# Patient Record
Sex: MALE
[De-identification: names, ages, dates, MRNs, and addresses within clinical notes are randomized per-mention and may not be internally consistent; named-entity substitution may affect disease eponyms.]

---

## 2017-06-12 ENCOUNTER — HOSPITAL ENCOUNTER (EMERGENCY)
Dept: HOSPITAL 14 - H.ER | Age: 42
Discharge: HOME | End: 2017-06-12
Payer: COMMERCIAL

## 2017-06-12 VITALS
DIASTOLIC BLOOD PRESSURE: 86 MMHG | SYSTOLIC BLOOD PRESSURE: 144 MMHG | HEART RATE: 97 BPM | RESPIRATION RATE: 16 BRPM | TEMPERATURE: 98.4 F | OXYGEN SATURATION: 99 %

## 2017-06-12 DIAGNOSIS — K11.20: Primary | ICD-10-CM

## 2017-06-12 NOTE — ED PDOC
HPI: Allergic Reaction


Time Seen by Provider: 06/12/17 19:43


Chief Complaint (Nursing): Allergic Reaction


Chief Complaint (Provider): Facial Swelling 


History Per: Patient


History/Exam Limitations: no limitations


Onset/Duration Of Symptoms: Hrs (prior to arrival )


Additional Complaint(s): 





Mynor Vargas, 41 year old male presents to the ED on 6/12/17 after 

experiencing facial swelling. The patient reports facial swelling 3 times in 

the past after eating cured meat. He reports the first couple of times of 

facial swelling after eating other meats, the swelling has decreased. He ate 

salami, noted that the swelling was resolved and then ate tacos with beef with 

recurring swelling. The swelling is isolated to the left facial area near the 

jawline. The patient reports tightness and mild tenderness, stating that the 

swelling feels like a straining pressure. He denies fever, chills, nausea, 

vomiting, body aches, neck pain, dizziness, chest pain, shortness of breath, or 

headaches. He also has no known food allergies. 





Past Medical History


Reviewed: Historical Data, Nursing Documentation, Vital Signs


Vital Signs: 


 Last Vital Signs











Temp  98.4 F   06/12/17 19:21


 


Pulse  97 H  06/12/17 19:21


 


Resp  16   06/12/17 19:21


 


BP  144/86   06/12/17 19:21


 


Pulse Ox  99   06/12/17 19:21














- Medical History


PMH: Gastrointestinal Ulcer (G. I. bleed)


   Denies: Chronic Kidney Disease





- Surgical History


Surgical History: Cholecystectomy, Endoscopy





- Family History


Family History: States: Unknown Family Hx





- Immunization History


Hx Tetanus Toxoid Vaccination: Yes


Hx Influenza Vaccination: No


Hx Pneumococcal Vaccination: No





- Home Medications


Home Medications: 


 Ambulatory Orders











 Medication  Instructions  Recorded


 


traMADol [Ultram] 50 mg PO Q4 #20 tab 10/27/16


 


Hydrocodone/Acetaminophen 1 each PO Q6 PRN #15 tablet 10/29/16





[Hydrocodon-Acetaminophen 5-325]  


 


Ondansetron [Zofran Odt] 4 mg PO Q8 PRN #12 odt 10/29/16


 


Amoxicillin/Clavulanate [Augmentin 1 tab PO BID #14 tab 06/13/17





875 MG-125 MG]  














- Allergies


Allergies/Adverse Reactions: 


 Allergies











Allergy/AdvReac Type Severity Reaction Status Date / Time


 


No Known Allergies Allergy   Unverified 10/29/16 07:20














Review of Systems


ROS Statement: Except As Marked, All Systems Reviewed And Found Negative


Constitutional: Negative for: Fever, Chills, Other (no body aches)


ENT: Positive for: Mouth Swelling (swelling localized to the left facial area 

near jawline )


Cardiovascular: Negative for: Chest Pain


Respiratory: Negative for: Shortness of Breath


Gastrointestinal: Negative for: Nausea, Vomiting


Musculoskeletal: Negative for: Neck Pain


Neurological: Negative for: Headache, Dizziness





Physical Exam





- Reviewed


Nursing Documentation Reviewed: Yes


Vital Signs Reviewed: Yes





- Physical Exam


Appears: Positive for: Non-toxic, No Acute Distress


Head Exam: Positive for: ATRAUMATIC, NORMOCEPHALIC


ENT: Positive for: Normal ENT Inspection, Pharynx Is (normal ), TM Is/Are (

normal ).  Negative for: Tonsillar Swelling (and no uvula or tonque swelling; 

parotid area swelling present ), Other (no gum erythema; no mastoid process 

tenderness )


Neurologic/Psych: Positive for: Alert, Oriented (x3)





- ECG


O2 Sat by Pulse Oximetry: 99 (RA)


Pulse Ox Interpretation: Normal





Disposition





- Clinical Impression


Clinical Impression: 


 Sialadenitis








- Patient ED Disposition


Is Patient to be Admitted: No


Counseled Patient/Family Regarding: Studies Performed, Diagnosis, Need For 

Followup, Rx Given





- Disposition


Disposition: Routine/Home


Disposition Time: 21:00


Condition: STABLE


Additional Instructions: 


suck on very sour foods ie lemon, lime and or sour candy. and put warm compress 

to area


Prescriptions: 


Amoxicillin/Clavulanate [Augmentin 875 MG-125 MG] 1 tab PO BID #14 tab


Instructions:  Sialoadenitis (ED)





Medical Decision Making


Medical Decision Making: 





Initial Impression:


Patient has sialadenitis. There is low suspicion for mumps/parotitis due to no 

nausea, vomiting, fever, body aches and unilateral swelling. Low suspicion for 

lymph node infection due to no nausea, vomiting, fever, body aches, and 

unilateral swelling. While in ED, patient manually moved jaw which led to 

decreased swelling. PT also givne Rx for augmentin. advised if with pus 

drainage to consume. 


Advised patient to suck on sour things to elicit salivation and open blocked 

duct. Swelling does not affect underneath eyes and there is no protrusion to 

the ears. Patient advised to return to the emergency room if he experiences any 

kind of body aches, fever, or chills. Advised patient to apply warm compress to 

area of swelling. Patient agreed to treatment plan. 





--------------------------------------------------------------------------------

----------------- 


Scribe Attestation:


Documented by Mariana Castillo, acting as a scribe for Margarita Pope PA-C.


 


Provider Scribe Attestation:


All medical record entries made by the Scribe were at my direction and 

personally dictated by me. I have reviewed the chart and agree that the record 

accurately reflects my personal performance of the history, physical exam, 

medical decision making, and the department course for this patient. I have 

also personally directed, reviewed, and agree with the discharge instructions 

and disposition.

## 2017-08-03 ENCOUNTER — HOSPITAL ENCOUNTER (EMERGENCY)
Dept: HOSPITAL 14 - H.ER | Age: 42
Discharge: HOME | End: 2017-08-03
Payer: COMMERCIAL

## 2017-08-03 VITALS
RESPIRATION RATE: 16 BRPM | DIASTOLIC BLOOD PRESSURE: 77 MMHG | TEMPERATURE: 98 F | OXYGEN SATURATION: 99 % | HEART RATE: 83 BPM | SYSTOLIC BLOOD PRESSURE: 133 MMHG

## 2017-08-03 DIAGNOSIS — K08.89: ICD-10-CM

## 2017-08-03 DIAGNOSIS — H66.91: Primary | ICD-10-CM

## 2017-08-03 DIAGNOSIS — R59.9: ICD-10-CM

## 2017-08-03 NOTE — ED PDOC
HPI: General Adult


Time Seen by Provider: 17 13:41


Chief Complaint (Nursing): Abnormal Skin Integrity


Chief Complaint (Provider): Dental Pain/Ear Pain


History Per: Patient


History/Exam Limitations: no limitations


Onset/Duration Of Symptoms: Days (3)


Severity: Moderate


Additional Complaint(s): 


Mynor Vargas is a 40 y/o male presenting to the ER on 8/3/2017 with 

complaints of right ear pain and left-sided jaw pain for three days. Patient 

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 

years and notes he swam 2 weeks ago.  He also notes that he does have a chipped 

upper left molar that has been present for several years.  Patient is unsure if 

any of this is the cause of his current symptoms. He denies any associated fever

, heat/cold intolerance to any food or drinks, and notes he has not taken any 

pain medications for his symptoms prior to arrival.  Patient is also presenting 

with a lump underneath his chin that he notes he noticed this morning. 





Past Medical History


Reviewed: Historical Data, Nursing Documentation, Vital Signs


Vital Signs: 


 Last Vital Signs











Temp  98.0 F   17 13:20


 


Pulse  83   17 13:20


 


Resp  16   17 13:20


 


BP  133/77   17 13:20


 


Pulse Ox  99   17 15:34














- Medical History


PMH: Gastrointestinal Ulcer (G. I. bleed)


   Denies: Chronic Kidney Disease





- Surgical History


Surgical History: Cholecystectomy, Endoscopy





- Family History


Family History: States: Unknown Family Hx





- Social History


Current smoker - smoking cessation education provided: No


Alcohol: None


Drugs: Denies





- Immunization History


Hx Tetanus Toxoid Vaccination: Yes


Hx Influenza Vaccination: No


Hx Pneumococcal Vaccination: No





- Home Medications


Home Medications: 


 Ambulatory Orders











 Medication  Instructions  Recorded


 


traMADol [Ultram] 50 mg PO Q4 #20 tab 10/27/16


 


Hydrocodone/Acetaminophen 1 each PO Q6 PRN #15 tablet 10/29/16





[Hydrocodon-Acetaminophen 5-325]  


 


Ondansetron [Zofran Odt] 4 mg PO Q8 PRN #12 odt 10/29/16


 


Amoxicillin/Clavulanate [Augmentin 1 tab PO BID #14 tab 17





875 MG-125 MG]  


 


Amoxicillin/Clavulanate [Augmentin 1 tab PO BID #20 tab 17





875 MG-125 MG]  


 


Naproxen [Naprosyn] 500 mg PO BID PRN #20 tablet 17














- Allergies


Allergies/Adverse Reactions: 


 Allergies











Allergy/AdvReac Type Severity Reaction Status Date / Time


 


No Known Allergies Allergy   Unverified 10/29/16 07:20














Review of Systems


ROS Statement: Except As Marked, All Systems Reviewed And Found Negative


Constitutional: Negative for: Fever


ENT: Positive for: Ear Pain, Other ((+)left-sided dental pain )





Physical Exam





- Reviewed


Nursing Documentation Reviewed: Yes


Vital Signs Reviewed: Yes





- Physical Exam


Appears: Positive for: Non-toxic, No Acute Distress


Head Exam: Positive for: ATRAUMATIC, NORMOCEPHALIC


Skin: Positive for: Normal Color.  Negative for: Rash


Eye Exam: Positive for: Normal appearance, EOMI, PERRL, Other (no ttp to teeth, 

no open wounds )


ENT: Positive for: Normal ENT Inspection ((+) inflammation to left gingiva ), 

TM Is/Are (right TM is erythematous and bulging. )


Neck: Positive for: Normal ((+)approximately 1 cm, tender, nonindurated lymph 

node noted, (-)overlying erythema)


Lymphatic: Positive for: Normal Exam (minimal swelling to submental lymph node 

with ttp )


Neurologic/Psych: Positive for: Alert, Oriented.  Negative for: Motor/Sensory 

Deficits





- ECG


O2 Sat by Pulse Oximetry: 99


Pulse Ox Interpretation: Normal





Medical Decision Making


Medical Decision Makin:41





Initial Impression- 40 y/o male with dental pain and ear pain





Pt will be discharged routinely with rx for Augmentin and Naprosyn. Patient was 

advised to place compresses on submental lymph node.  Pt was encouraged to 

schedule a follow-up with Dr. Behin within 2-3 days. Advised to return if 

symptoms persist or worsen. Condition is stable for discharge.





Documented by Dieter Sepulveda, acting as a scribe for Suki Meek PA-C





All medical record entries made by the Scribe were at my direction and 

personally dictated by me. I


have reviewed the chart and agree that the record accurately reflects my 

personal performance of the


history, physical exam, medical decision making, and the department course for 

this patient. I have


also personally directed, reviewed, and agree with the discharge instructions 

and disposition.





Disposition





- Clinical Impression


Clinical Impression: 


 Otitis media, Dentalgia, Lymphadenopathy








- Patient ED Disposition


Is Patient to be Admitted: No


Counseled Patient/Family Regarding: Diagnosis, Need For Followup, Rx Given





- Disposition


Referrals: 


Behin,Babak, MD [Staff Provider] - 


Disposition: Routine/Home


Disposition Time: 15:19


Condition: STABLE


Additional Instructions: 


Take medication as directed.  Follow up with ENT in 2-3 days.


Prescriptions: 


Amoxicillin/Clavulanate [Augmentin 875 MG-125 MG] 1 tab PO BID #20 tab


Naproxen [Naprosyn] 500 mg PO BID PRN #20 tablet


 PRN Reason: Pain, Moderate (4-7)


Instructions:  Lymphadenopathy (ED), Otitis Media (ED), Toothache (ED)


Forms:  [facility] (English)

## 2017-09-16 ENCOUNTER — HOSPITAL ENCOUNTER (EMERGENCY)
Dept: HOSPITAL 14 - H.ER | Age: 42
Discharge: HOME | End: 2017-09-16
Payer: COMMERCIAL

## 2017-09-16 VITALS
HEART RATE: 90 BPM | OXYGEN SATURATION: 100 % | SYSTOLIC BLOOD PRESSURE: 133 MMHG | DIASTOLIC BLOOD PRESSURE: 78 MMHG | RESPIRATION RATE: 16 BRPM | TEMPERATURE: 97.8 F

## 2017-09-16 VITALS — BODY MASS INDEX: 36.9 KG/M2
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
## 2017-09-16 DIAGNOSIS — S99.921A: Primary | ICD-10-CM

## 2017-09-16 PROCEDURE — 99285 EMERGENCY DEPT VISIT HI MDM: CPT

## 2017-09-16 PROCEDURE — 96372 THER/PROPH/DIAG INJ SC/IM: CPT

## 2017-09-16 PROCEDURE — 73630 X-RAY EXAM OF FOOT: CPT

## 2017-09-16 NOTE — RAD
PROCEDURE:  Right Foot Radiographs.



HISTORY:

trauma  



COMPARISON:

None.



FINDINGS:



BONES:

Normal. No fracture. 



JOINTS:

Normal. 



SOFT TISSUES:

Normal. 



OTHER FINDINGS:

None.



IMPRESSION:

Normal right foot radiographs.

## 2017-09-16 NOTE — ED PDOC
HPI: General Adult


Time Seen by Provider: 09/16/17 12:00


Chief Complaint (Nursing): Lower Extremity Problem/Injury


History Per: Patient


Additional Complaint(s): 





Pt. states last Sunday he stepped over an uneven curb and injured his R foot. 

Pt. states he tried to rest for the foot for 2 days by not going to work but 

when he returned to work he still had pain and since then pain has worsened. 

Pain is on the top and bottom of foot. Denies numbness, tingling, ankle pain, 

other injury.





Past Medical History


Reviewed: Historical Data, Nursing Documentation, Vital Signs


Vital Signs: 


 Last Vital Signs











Temp  97.8 F   09/16/17 11:56


 


Pulse  90   09/16/17 11:56


 


Resp  16   09/16/17 11:56


 


BP  133/78   09/16/17 11:56


 


Pulse Ox  100   09/16/17 12:27














- Medical History


PMH: Gastrointestinal Ulcer (G. I. bleed)


   Denies: Chronic Kidney Disease





- Surgical History


Surgical History: Cholecystectomy, Endoscopy





- Family History


Family History: States: Unknown Family Hx





- Immunization History


Hx Tetanus Toxoid Vaccination: Yes


Hx Influenza Vaccination: No


Hx Pneumococcal Vaccination: No





- Home Medications


Home Medications: 


 Ambulatory Orders











 Medication  Instructions  Recorded


 


traMADol [Ultram] 50 mg PO Q4 #20 tab 10/27/16


 


Hydrocodone/Acetaminophen 1 each PO Q6 PRN #15 tablet 10/29/16





[Hydrocodon-Acetaminophen 5-325]  


 


Ondansetron [Zofran Odt] 4 mg PO Q8 PRN #12 odt 10/29/16


 


Amoxicillin/Clavulanate [Augmentin 1 tab PO BID #14 tab 06/13/17





875 MG-125 MG]  


 


Amoxicillin/Clavulanate [Augmentin 1 tab PO BID #20 tab 08/03/17





875 MG-125 MG]  


 


Naproxen [Naprosyn] 500 mg PO BID PRN #20 tablet 08/03/17


 


Meloxicam [Mobic] 15 mg PO DAILY PRN #15 tab 09/16/17














- Allergies


Allergies/Adverse Reactions: 


 Allergies











Allergy/AdvReac Type Severity Reaction Status Date / Time


 


No Known Allergies Allergy   Verified 09/16/17 12:30














Review of Systems


ROS Statement: Except As Marked, All Systems Reviewed And Found Negative


Musculoskeletal: Positive for: Foot Pain





Physical Exam





- Physical Exam


Appears: Positive for: Well, Non-toxic, No Acute Distress


Skin: Positive for: Normal Color, Warm.  Negative for: Rash


Eye Exam: Positive for: Normal appearance


Pulses-Dorsalis Pedis (L): 2+


Pulses-Dorsalis Pedis (R): 2+


Extremity: Positive for: Capillary Refill (< 2 seconds of R foot), Other (

Minimal tenderness on dorsal and plantar surface of R foot without swelling, 

deformity or ecchymosis; no ankle tenderness).  Negative for: Pedal Edema, Calf 

Tenderness (b/l)


Neurologic/Psych: Positive for: Alert, Oriented





- ECG


O2 Sat by Pulse Oximetry: 100





- Radiology


X-Ray: Interpreted by Me (R foot x-ray)


X-Ray Interpretation: Other (ossification noted at 1st distal MTP; no fx)





- Progress


ED Course And Treament: 





Toradol 30mg IM ordered. Foot x-rays ordered. 





Foot ace wrapped by PA. Crutches provided. Work note will be given. F/U with 

podiatrist for further evaluation.





Disposition





- Clinical Impression


Clinical Impression: 


 Foot sprain








- Patient ED Disposition


Is Patient to be Admitted: No





- Disposition


Referrals: 


[facility] [place] [Outside]


Disposition: Routine/Home


Disposition Time: 12:58


Condition: STABLE


Prescriptions: 


Meloxicam [Mobic] 15 mg PO DAILY PRN #15 tab


 PRN Reason: pain


Instructions:  Foot Sprain (ED), RICE Therapy (ED)


Forms:  [facility] (English), [facility] ED School/Work Excuse


Print Language: ENGLISH

## 2017-10-26 ENCOUNTER — HOSPITAL ENCOUNTER (EMERGENCY)
Dept: HOSPITAL 14 - H.ER | Age: 42
Discharge: HOME | End: 2017-10-26
Payer: COMMERCIAL

## 2017-10-26 VITALS
OXYGEN SATURATION: 98 % | SYSTOLIC BLOOD PRESSURE: 120 MMHG | RESPIRATION RATE: 19 BRPM | DIASTOLIC BLOOD PRESSURE: 78 MMHG | TEMPERATURE: 97 F | HEART RATE: 78 BPM

## 2017-10-26 VITALS — BODY MASS INDEX: 36.9 KG/M2

## 2017-10-26 DIAGNOSIS — C16.9: ICD-10-CM

## 2017-10-26 DIAGNOSIS — R10.13: Primary | ICD-10-CM

## 2017-10-26 DIAGNOSIS — R06.02: ICD-10-CM

## 2017-10-26 LAB
ALBUMIN/GLOB SERPL: 1.2 {RATIO} (ref 1–2.1)
ALP SERPL-CCNC: 103 U/L (ref 38–126)
ALT SERPL-CCNC: 45 U/L (ref 21–72)
AST SERPL-CCNC: 33 U/L (ref 17–59)
BASOPHILS # BLD AUTO: 0.1 K/UL (ref 0–0.2)
BASOPHILS NFR BLD: 1 % (ref 0–2)
BILIRUB SERPL-MCNC: 2 MG/DL (ref 0.2–1.3)
BUN SERPL-MCNC: 17 MG/DL (ref 9–20)
CALCIUM SERPL-MCNC: 9.3 MG/DL (ref 8.4–10.2)
CHLORIDE SERPL-SCNC: 105 MMOL/L (ref 98–107)
CO2 SERPL-SCNC: 27 MMOL/L (ref 22–30)
EOSINOPHIL # BLD AUTO: 0 K/UL (ref 0–0.7)
EOSINOPHIL NFR BLD: 0.6 % (ref 0–4)
ERYTHROCYTE [DISTWIDTH] IN BLOOD BY AUTOMATED COUNT: 13.4 % (ref 11.5–14.5)
GLOBULIN SER-MCNC: 3.7 GM/DL (ref 2.2–3.9)
GLUCOSE SERPL-MCNC: 90 MG/DL (ref 75–110)
HCT VFR BLD CALC: 39.8 % (ref 35–51)
LIPASE SERPL-CCNC: 58 U/L (ref 23–300)
LYMPHOCYTES # BLD AUTO: 1.7 K/UL (ref 1–4.3)
LYMPHOCYTES NFR BLD AUTO: 22.1 % (ref 20–40)
MCH RBC QN AUTO: 30.5 PG (ref 27–31)
MCHC RBC AUTO-ENTMCNC: 34.2 G/DL (ref 33–37)
MCV RBC AUTO: 89.2 FL (ref 80–94)
MONOCYTES # BLD: 0.6 K/UL (ref 0–0.8)
MONOCYTES NFR BLD: 8.4 % (ref 0–10)
NEUTROPHILS # BLD: 5.2 K/UL (ref 1.8–7)
NEUTROPHILS NFR BLD AUTO: 67.9 % (ref 50–75)
NRBC BLD AUTO-RTO: 0 % (ref 0–0)
PLATELET # BLD: 291 K/UL (ref 130–400)
PMV BLD AUTO: 7.5 FL (ref 7.2–11.7)
POTASSIUM SERPL-SCNC: 3.7 MMOL/L (ref 3.6–5)
PROT SERPL-MCNC: 8.2 G/DL (ref 6.3–8.2)
SODIUM SERPL-SCNC: 143 MMOL/L (ref 132–148)
WBC # BLD AUTO: 7.7 K/UL (ref 4.8–10.8)

## 2017-10-26 PROCEDURE — 99281 EMR DPT VST MAYX REQ PHY/QHP: CPT

## 2017-10-26 PROCEDURE — 84484 ASSAY OF TROPONIN QUANT: CPT

## 2017-10-26 PROCEDURE — 85025 COMPLETE CBC W/AUTO DIFF WBC: CPT

## 2017-10-26 PROCEDURE — 93005 ELECTROCARDIOGRAM TRACING: CPT

## 2017-10-26 PROCEDURE — 80053 COMPREHEN METABOLIC PANEL: CPT

## 2017-10-26 PROCEDURE — 83690 ASSAY OF LIPASE: CPT

## 2017-10-26 PROCEDURE — 74177 CT ABD & PELVIS W/CONTRAST: CPT

## 2017-10-26 PROCEDURE — 71020: CPT

## 2017-10-26 PROCEDURE — 96374 THER/PROPH/DIAG INJ IV PUSH: CPT

## 2017-10-26 NOTE — CT
PROCEDURE:  CT Abdomen and Pelvis with contrast



HISTORY:

persistent abdominal pain



COMPARISON:

None.



TECHNIQUE:

Contrast dose: 100 cc Omnipaque 300



Radiation dose:



Total exam DLP = 1138.0 mGy-cm.



This CT exam was performed using one or more of the following dose 

reduction techniques: Automated exposure control, adjustment of the 

mA and/or kV according to patient size, and/or use of iterative 

reconstruction technique.



FINDINGS:



LOWER THORAX:

Unremarkable. 



LIVER:

 Hepatic steatosis. No focal masses.  No intrahepatic bile duct 

dilatation or perihepatic ascites.  



GALLBLADDER AND BILE DUCTS:

Status post cholecystectomy. No abnormality is seen in the 

gallbladder fossa.  



PANCREAS:

Unremarkable. No gross lesion or ductal dilatation.



SPLEEN:

Unremarkable. 



ADRENALS:

Unremarkable. No mass. 



KIDNEYS AND URETERS:

Unremarkable. No hydronephrosis. No solid mass. 



VASCULATURE:

Unremarkable. No aortic aneurysm. 



BOWEL:

Findings consistent with senescent colitis. Diverticulosis without an 

acute inflammatory component or other associated pathologic process. 



APPENDIX:

Normal appendix. 



PERITONEUM:

Unremarkable. No free fluid. No free air. 



LYMPH NODES:

Unremarkable. No enlarged lymph nodes. 



BLADDER:

Unremarkable. 



REPRODUCTIVE:

Unremarkable. 



BONES:

No acute fracture. 



OTHER FINDINGS:

None.



IMPRESSION:

No acute findings related to/accounting  for the clinical 

presentation. Additional benign and/or incidental findings described 

above.

## 2017-10-26 NOTE — ED PDOC
- Laboratory Results


Result Diagrams: 


 10/26/17 14:30





 10/26/17 14:30





- ECG


O2 Sat by Pulse Oximetry: 97





- Progress


ED Course And Treament: 





Accession No. : [id number]


Patient Name / ID : BILL MENDEZ  / [id number]


Exam Date : 10/26/2017 17:50:06 ( Approved )


Study Comment : 


Sex / Age : M  / 41Y





Creator : Jose Roberto Davis MD


Dictator : Jose Roberto Davis MD


 : 


Approver : Jose Roberto Davis MD


Approver2 : 





Report Date : 10/26/2017 18:31:17


My Comment : 


********************************************************************************

***





PROCEDURE:  CT Abdomen and Pelvis with contrast





HISTORY:


persistent abdominal pain





COMPARISON:


None.





TECHNIQUE:


Contrast dose: 100 cc Omnipaque 300





Radiation dose:





Total exam DLP = 1138.0 mGy-cm.





This CT exam was performed using one or more of the following dose reduction 

techniques: Automated exposure control, adjustment of the mA and/or kV 

according to patient size, and/or use of iterative reconstruction technique.





FINDINGS:





LOWER THORAX:


Unremarkable. 





LIVER:


 Hepatic steatosis. No focal masses.  No intrahepatic bile duct dilatation or 

perihepatic ascites.  





GALLBLADDER AND BILE DUCTS:


Status post cholecystectomy. No abnormality is seen in the gallbladder fossa.  





PANCREAS:


Unremarkable. No gross lesion or ductal dilatation.





SPLEEN:


Unremarkable. 





ADRENALS:


Unremarkable. No mass. 





KIDNEYS AND URETERS:


Unremarkable. No hydronephrosis. No solid mass. 





VASCULATURE:


Unremarkable. No aortic aneurysm. 





BOWEL:


Findings consistent with senescent colitis. Diverticulosis without an acute 

inflammatory component or other associated pathologic process. 





APPENDIX:


Normal appendix. 





PERITONEUM:


Unremarkable. No free fluid. No free air. 





LYMPH NODES:


Unremarkable. No enlarged lymph nodes. 





BLADDER:


Unremarkable. 





REPRODUCTIVE:


Unremarkable. 





BONES:


No acute fracture. 





OTHER FINDINGS:


None.





IMPRESSION:


No acute findings related to/accounting  for the clinical presentation. 

Additional benign and/or incidental findings described above.





 





Disposition


Counseled Patient/Family Regarding: Studies Performed, Diagnosis, Need For 

Followup, Rx Given





- Clinical Impression


Clinical Impression: 


 Epigastric abdominal pain








- POA


Present On Arrival: None





- Disposition


Disposition: Routine/Home


Disposition Time: 18:00


Condition: STABLE


Additional Instructions: 


TAKE PRILOSEC DAILY AND TAKE BENTYL ONLY AS NEEDED FOR PAIN





FOLLOW UP WITH DR PEREIRA AS SOON AS YOU CAN FOR FURTHER EVALUATION


Prescriptions: 


Dicyclomine [Bentyl] 20 mg PO BID PRN #30 tab


 PRN Reason: abdominal pain


Instructions:  Abdominal Pain (ED)


Forms:  [facility] ED School/Work Excuse

## 2017-10-26 NOTE — ED PDOC
HPI: Abdomen





<Dai Charlton - Last Filed: 10/26/17 17:54>





<Greta Bazzi - Last Filed: 10/26/17 23:06>


Time Seen by Provider: 10/26/17 13:42


Chief Complaint (Nursing): Abdominal Pain


Additional Complaint(s): 





41M p/w 2 days of epigastric pain, 5-6/10, sharp that worsens with coughing, 

laughing and associated with non-bloody diarrhea 3x/day as well as burning/

reflux-like pain.  He tried Pepto-Bismal this morning and has not noticed any 

improvement of diarrhea. He does describe some shortness of breath on exertion. 

Otherwise, he denies early satiety, weight loss, loss of appetite, chest pain, 

palpitations, nausea, vomiting, denies meds for GERD, denies fever, chills, 

sore throat, dysuria.





PMD: Kamaljit Land





PMH: None


PSH: Lap CCY


PFH: significant for stomach ca


Smoke: No


Alcohol: Occ


Drugs: Denies





Meds:None


Allergies: NKDA (Dai Charlton)





Supervising Attending Note





- Supervising Attending Note


The Documented history was done by the: Physician Extender, Attending Physician


The documented physical exam was done by the: Physician Extender, Attending 

Physician





- Attestation:


I have personally seen and examined this patient.: Yes


I have fully participated in the care of the patient.: Yes


I have reviewed all pertinent clinical information: Yes





<Greta Bazzi - Last Filed: 10/26/17 23:06>





Past Medical History





- Medical History


PMH: Gastrointestinal Ulcer (G. I. bleed)


   Denies: Chronic Kidney Disease





- Surgical History


Surgical History: Cholecystectomy, Endoscopy





- Family History


Family History: States: Unknown Family Hx





- Immunization History


Hx Tetanus Toxoid Vaccination: Yes


Hx Influenza Vaccination: No


Hx Pneumococcal Vaccination: No





<Dai Charlton - Last Filed: 10/26/17 17:54>





<Greta Bazzi - Last Filed: 10/26/17 23:06>


Vital Signs: 





 Last Vital Signs











Temp  97 F L  10/26/17 19:12


 


Pulse  78   10/26/17 19:12


 


Resp  19   10/26/17 19:12


 


BP  120/78   10/26/17 19:12


 


Pulse Ox  98   10/26/17 19:12














- Home Medications


Home Medications: 


 Ambulatory Orders











 Medication  Instructions  Recorded


 


traMADol [Ultram] 50 mg PO Q4 #20 tab 10/27/16


 


Hydrocodone/Acetaminophen 1 each PO Q6 PRN #15 tablet 10/29/16





[Hydrocodon-Acetaminophen 5-325]  


 


Ondansetron [Zofran Odt] 4 mg PO Q8 PRN #12 odt 10/29/16


 


Amoxicillin/Clavulanate [Augmentin 1 tab PO BID #14 tab 06/13/17





875 MG-125 MG]  


 


Amoxicillin/Clavulanate [Augmentin 1 tab PO BID #20 tab 08/03/17





875 MG-125 MG]  


 


Naproxen [Naprosyn] 500 mg PO BID PRN #20 tablet 08/03/17


 


Meloxicam [Mobic] 15 mg PO DAILY PRN #15 tab 09/16/17


 


Dicyclomine [Bentyl] 20 mg PO BID PRN #30 tab 10/26/17














- Allergies


Allergies/Adverse Reactions: 


 Allergies











Allergy/AdvReac Type Severity Reaction Status Date / Time


 


No Known Allergies Allergy   Verified 09/16/17 12:30














Review of Systems


ROS Statement: Except As Marked, All Systems Reviewed And Found Negative


Respiratory: Positive for: SOB with Exertion


Gastrointestinal: Positive for: Abdominal Pain (epigastric)


Neurological: Positive for: Dizziness





<Dai Charlton - Last Filed: 10/26/17 17:54>





Physical Exam





- Reviewed


Vital Signs Reviewed: Yes





- Physical Exam


Appears: Positive for: Well, Non-toxic, No Acute Distress


Head Exam: Positive for: ATRAUMATIC, NORMAL INSPECTION


Skin: Positive for: Normal Color, Warm, Dry


Eye Exam: Positive for: Normal appearance, EOMI, PERRL


Neck: Positive for: Normal, Supple


Cardiovascular/Chest: Positive for: Regular Rate, Rhythm


Respiratory: Positive for: Normal Breath Sounds.  Negative for: Wheezing, 

Respiratory Distress


Gastrointestinal/Abdominal: Positive for: Bowel Sounds, Soft, Tenderness (Very 

tender at epigastric), Other (no hernia noted).  Negative for: Mass, Rebound


Extremity: Positive for: Normal ROM.  Negative for: Pedal Edema


Neurologic/Psych: Positive for: Alert, Oriented





<Dai Charlton - Last Filed: 10/26/17 17:54>





- Laboratory Results


Result Diagrams: 


 10/26/17 14:30





 10/26/17 14:30





- ECG


O2 Sat by Pulse Oximetry: 97





<DenraúlDai - Last Filed: 10/26/17 17:54>





- Laboratory Results


Result Diagrams: 


 10/26/17 14:30





 10/26/17 14:30





<Greta Bazzi - Last Filed: 10/26/17 23:06>





Medical Decision Making





<Dai Charlton - Last Filed: 10/26/17 17:54>





<Greta Bazzi - Last Filed: 10/26/17 23:06>


Medical Decision Making: 


Ddx: gastritis, GERD, ventral hernia, pancreatitis, MI


- EKG


- Troponin: WNL


- CBC: WNL


- CMP: TBili- 2.0


- Famotidine: given


- CXR


- 1L NS Bolus- given





Epigastric pain has not improved and CXR suboptimal due to patient experiencing 

discomfort on deep inspiration.  Therefore, CT abd/pelvis ordered with 

contrast. PO completed at ~ 1600.





CT scan: PENDING (Dai Charlton)





Disposition





- Patient ED Disposition


Is Patient to be Admitted: Transfer of Care


Discussed With DrJimenez: Greta Bazzi


Doctor Will See Patient In The: ED


Counseled Patient/Family Regarding: Studies Performed





- Disposition


Disposition Time: 17:53





<Dai Charlton - Last Filed: 10/26/17 17:54>





<Greta Bazzi - Last Filed: 10/26/17 23:06>





- Clinical Impression


Clinical Impression: 


 Epigastric abdominal pain








- Disposition


Condition: STABLE


Additional Instructions: 


TAKE PRILOSEC DAILY AND TAKE BENTYL ONLY AS NEEDED FOR PAIN





FOLLOW UP WITH DR PEREIRA AS SOON AS YOU CAN FOR FURTHER EVALUATION


Prescriptions: 


Dicyclomine [Bentyl] 20 mg PO BID PRN #30 tab


 PRN Reason: abdominal pain


Instructions:  Abdominal Pain (ED)


Forms:  [facility] ED School/Work Excuse

## 2017-10-26 NOTE — RAD
HISTORY:

dyspnea  



COMPARISON:

1/22/2010



TECHNIQUE:

Chest PA and lateral



FINDINGS:



LUNGS:

No active pulmonary disease.



PLEURA:

No significant pleural effusion identified. No pneumothorax apparent.



CARDIOVASCULAR:

Normal.



OSSEOUS STRUCTURES:

No significant abnormalities.



VISUALIZED UPPER ABDOMEN:

Normal.



OTHER FINDINGS:

None.



IMPRESSION:

No active disease.

## 2017-10-27 NOTE — CARD
--------------- APPROVED REPORT --------------





EKG Measurement

Heart [id number]

[id number]

[id number]

[id number]

[id number]



<Conclusion>

Normal sinus rhythm

Normal ECG

## 2018-02-14 ENCOUNTER — HOSPITAL ENCOUNTER (EMERGENCY)
Dept: HOSPITAL 14 - H.ER | Age: 43
Discharge: HOME | End: 2018-02-14
Payer: COMMERCIAL

## 2018-02-14 VITALS — HEART RATE: 67 BPM

## 2018-02-14 VITALS
DIASTOLIC BLOOD PRESSURE: 88 MMHG | TEMPERATURE: 98.4 F | SYSTOLIC BLOOD PRESSURE: 123 MMHG | OXYGEN SATURATION: 97 % | RESPIRATION RATE: 19 BRPM

## 2018-02-14 VITALS — BODY MASS INDEX: 37.6 KG/M2

## 2018-02-14 DIAGNOSIS — J40: Primary | ICD-10-CM

## 2018-02-14 LAB
BASOPHILS # BLD AUTO: 0.1 K/UL (ref 0–0.2)
BASOPHILS NFR BLD: 1 % (ref 0–2)
BNP SERPL-MCNC: 20.8 PG/ML (ref 0–450)
BUN SERPL-MCNC: 13 MG/DL (ref 9–20)
CALCIUM SERPL-MCNC: 9.4 MG/DL (ref 8.4–10.2)
EOSINOPHIL # BLD AUTO: 0.1 K/UL (ref 0–0.7)
EOSINOPHIL NFR BLD: 1.9 % (ref 0–4)
ERYTHROCYTE [DISTWIDTH] IN BLOOD BY AUTOMATED COUNT: 13 % (ref 11.5–14.5)
GFR NON-AFRICAN AMERICAN: > 60
HGB BLD-MCNC: 14.4 G/DL (ref 12–18)
LYMPHOCYTES # BLD AUTO: 2.3 K/UL (ref 1–4.3)
LYMPHOCYTES NFR BLD AUTO: 29.3 % (ref 20–40)
MCH RBC QN AUTO: 30.3 PG (ref 27–31)
MCHC RBC AUTO-ENTMCNC: 34.2 G/DL (ref 33–37)
MCV RBC AUTO: 88.8 FL (ref 80–94)
MONOCYTES # BLD: 0.6 K/UL (ref 0–0.8)
MONOCYTES NFR BLD: 8.3 % (ref 0–10)
NEUTROPHILS # BLD: 4.6 K/UL (ref 1.8–7)
NEUTROPHILS NFR BLD AUTO: 59.5 % (ref 50–75)
NRBC BLD AUTO-RTO: 0.1 % (ref 0–0)
PLATELET # BLD: 298 K/UL (ref 130–400)
PMV BLD AUTO: 7.7 FL (ref 7.2–11.7)
RBC # BLD AUTO: 4.75 MIL/UL (ref 4.4–5.9)
WBC # BLD AUTO: 7.7 K/UL (ref 4.8–10.8)

## 2018-02-14 NOTE — ED PDOC
HPI: Chest Pain


Time Seen by Provider: 02/14/18 09:10


Chief Complaint (Nursing): Chest Pain


Chief Complaint (Provider): Chest Discomfort, Dyspnea, Wheezing


History Per: Patient


History/Exam Limitations: no limitations


Onset/Duration Of Symptoms: Days (x 3), Intermittent Episodes


Current Symptoms Are (Timing): Still Present


Additional Complaint(s): 





Mynor is a 43 y/o male who presents to the ED complaining of chest discomfort

, difficulty breathing, and wheezing on and off since Monday with no radiation. 

Patient states it sometimes happens when he walks or exerts himself which makes 

him worried. Last week, he was diagnosed with influenza and treated with 

tamiflu. He currently denies fever or sore throat, but notes a mild dry cough.





PMD: Malcolm Gonzalez





Past Medical History


Reviewed: Historical Data, Nursing Documentation, Vital Signs


Vital Signs: 


 Last Vital Signs











Temp  98.4 F   02/14/18 08:25


 


Pulse  84   02/14/18 08:25


 


Resp  19   02/14/18 08:25


 


BP  123/88   02/14/18 08:25


 


Pulse Ox  97   02/14/18 10:20














- Medical History


PMH: Gastrointestinal Ulcer (G. I. bleed), Gall Bladder Disease


   Denies: Chronic Kidney Disease





- Surgical History


Surgical History: Cholecystectomy, Endoscopy





- Family History


Family History: States: Unknown Family Hx





- Immunization History


Hx Tetanus Toxoid Vaccination: Yes


Hx Influenza Vaccination: No


Hx Pneumococcal Vaccination: No





- Home Medications


Home Medications: 


 Ambulatory Orders











 Medication  Instructions  Recorded


 


traMADol [Ultram] 50 mg PO Q4 #20 tab 10/27/16


 


Hydrocodone/Acetaminophen 1 each PO Q6 PRN #15 tablet 10/29/16





[Hydrocodon-Acetaminophen 5-325]  


 


Ondansetron [Zofran Odt] 4 mg PO Q8 PRN #12 odt 10/29/16


 


Amoxicillin/Clavulanate [Augmentin 1 tab PO BID #14 tab 06/13/17





875 MG-125 MG]  


 


Amoxicillin/Clavulanate [Augmentin 1 tab PO BID #20 tab 08/03/17





875 MG-125 MG]  


 


Naproxen [Naprosyn] 500 mg PO BID PRN #20 tablet 08/03/17


 


Meloxicam [Mobic] 15 mg PO DAILY PRN #15 tab 09/16/17


 


Dicyclomine [Bentyl] 20 mg PO BID PRN #30 tab 10/26/17


 


Albuterol HFA [Ventolin HFA 90 1 puff IH Q4 PRN #1 inh 02/14/18





mcg/actuation (8 g)]  














- Allergies


Allergies/Adverse Reactions: 


 Allergies











Allergy/AdvReac Type Severity Reaction Status Date / Time


 


No Known Allergies Allergy   Verified 09/16/17 12:30














KIMBERLY Risk Score for UA/NSTEMI





- KIMBERLY Risk Score


Age > 64: NO


3 or more CAD Risk Factors: NO


Known CAD (Stenosis greater than 50%): NO


Aspirin use in past 7 days: NO


Severe Angina: NO


EKG ST changes greater than 0.5mm: NO


Positive Cardiac Marker: NO


KIMBERLY Score: 0


Risk %: 5%





Wells Criteria for PE





- Wells Criteria for Pulmonary Embolism


Clinical Signs and Symptoms of DVT: No


P.E is #1 Diagnosis, or Equally Likely: No


Heart Rate >100: No


Immobilization at least 3 days;Surgery previous 4 weeks: No


Previous, objectively diagnosed PE or DVT: No


Hemoptysis: No


Malignancy w/treatment within 6 months, or palliative: No


Total Score: 0





Review of Systems


ROS Statement: Except As Marked, All Systems Reviewed And Found Negative


Constitutional: Negative for: Fever


ENT: Negative for: Throat Pain


Cardiovascular: Positive for: Chest Pain (discomfort)


Respiratory: Positive for: Cough (mild, dry), SOB with Exertion, Wheezing





Physical Exam





- Reviewed


Nursing Documentation Reviewed: Yes


Vital Signs Reviewed: Yes





- Physical Exam


Appears: Positive for: Well, Non-toxic, No Acute Distress


Head Exam: Positive for: ATRAUMATIC, NORMOCEPHALIC


Skin: Positive for: Normal Color, Warm, Dry


Eye Exam: Positive for: EOMI, Normal appearance, PERRL


Neck: Positive for: Normal, Painless ROM, Supple


Cardiovascular/Chest: Positive for: Regular Rate, Rhythm.  Negative for: Murmur


Respiratory: Positive for: Normal Breath Sounds.  Negative for: Respiratory 

Distress


Gastrointestinal/Abdominal: Positive for: Normal Exam, Soft.  Negative for: 

Tenderness


Back: Positive for: Normal Inspection.  Negative for: L CVA Tenderness, R CVA 

Tenderness, Vertebral Tenderness


Extremity: Positive for: Normal ROM.  Negative for: Pedal Edema, Deformity


Neurologic/Psych: Positive for: Alert, Oriented.  Negative for: Motor/Sensory 

Deficits





- Laboratory Results


Result Diagrams: 


 02/14/18 09:30





 02/14/18 09:30





- ECG


ECG: Positive for: Interpreted By Me, Viewed By Me


ECG Rhythm: Positive for: Normal QRS, Normal ST Segment, Sinus Rhythm.  

Negative for: ST/T Changes


Rate: 67


O2 Sat by Pulse Oximetry: 97 (RA)


Pulse Ox Interpretation: Normal





- Radiology


X-Ray: Interpreted by Me, Viewed By Me, Read By Radiologist


X-Ray Interpretation: No Acute Disease





Medical Decision Making


Medical Decision Making: 





Time: 9:24


Initial Impression: Chest Pain, Difficulty Breathing; Differentials include 

acute bronchitis, pneumonia, rule out ACS


Initial Plan:


--EKG


--BNP


--BMP


--Troponin


--CBC


--Chest XR





Time: 9:49


CHEST XR


FINDINGS:





LUNGS:


No active pulmonary disease. Shallow lung volumes crowd the infrahilar 

bronchovascular markings and accentuate the mild cardiomegaly 





PLEURA:


No significant pleural effusion identified. No pneumothorax apparent.





CARDIOVASCULAR:


Minimal-mild cardiomegaly





OSSEOUS STRUCTURES:


No significant abnormalities.





VISUALIZED UPPER ABDOMEN:


Normal.





OTHER FINDINGS:


None.





IMPRESSION:


No active disease. No interval pathology noted





--------------------------------------------------------------------------------

---------------


Scribe Attestation:


Documented by Shawn Zabala, acting as a scribe for Dr. Kiki Doran MD.





Provider Scribe Attestation:


All medical record entries made by the Scribe were at my direction and 

personally dictated by me. I have reviewed the chart and agree that the record 

accurately reflects my personal performance of the history, physical exam, 

medical decision making, and the department course for this patient. I have 

also personally directed, reviewed, and agree with the discharge instructions 

and disposition.





Disposition





- Clinical Impression


Clinical Impression: 


 Chest pain, Bronchitis








- Patient ED Disposition


Is Patient to be Admitted: No


Doctor Will See Patient In The: Office


Counseled Patient/Family Regarding: Studies Performed, Diagnosis, Need For 

Followup





- Disposition


Referrals: 


[facility] [Outside]


Disposition: Routine/Home


Disposition Time: 11:53


Condition: GOOD


Additional Instructions: 


Follow up with your PCP in 2-3 days. Take your medications as instructed. 





Prescriptions: 


Albuterol HFA [Ventolin HFA 90 mcg/actuation (8 g)] 1 puff IH Q4 PRN #1 inh


 PRN Reason: Wheezing


Instructions:  Acute Bronchitis (ED), Chest Pain (ED)

## 2018-02-14 NOTE — RAD
HISTORY:

chest pain  



COMPARISON:

10/26/2017



TECHNIQUE:

Chest PA and lateral



FINDINGS:



LUNGS:

No active pulmonary disease. Shallow lung volumes crowd the 

infrahilar bronchovascular markings and accentuate the mild 

cardiomegaly 



PLEURA:

No significant pleural effusion identified. No pneumothorax apparent.



CARDIOVASCULAR:

Minimal-mild cardiomegaly



OSSEOUS STRUCTURES:

No significant abnormalities.



VISUALIZED UPPER ABDOMEN:

Normal.



OTHER FINDINGS:

None.



IMPRESSION:

No active disease. No interval pathology noted

## 2018-02-14 NOTE — CARD
--------------- APPROVED REPORT --------------





EKG Measurement

Heart [id number]

MO [id number]

[id number]

[id number]

[id number]



<Conclusion>

Normal sinus rhythm

Normal ECG

## 2018-06-20 ENCOUNTER — HOSPITAL ENCOUNTER (EMERGENCY)
Dept: HOSPITAL 14 - H.ER | Age: 43
Discharge: HOME | End: 2018-06-20
Payer: COMMERCIAL

## 2018-06-20 VITALS
DIASTOLIC BLOOD PRESSURE: 83 MMHG | TEMPERATURE: 98.2 F | OXYGEN SATURATION: 96 % | RESPIRATION RATE: 16 BRPM | SYSTOLIC BLOOD PRESSURE: 125 MMHG | HEART RATE: 75 BPM

## 2018-06-20 VITALS — BODY MASS INDEX: 36.2 KG/M2
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
## 2018-06-20 DIAGNOSIS — S83.92XA: Primary | ICD-10-CM
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
## 2018-06-20 NOTE — RAD
PROCEDURE:  Left Knee Radiographs.



HISTORY:

Pain.



COMPARISON:

None.



FINDINGS:



BONES:

No acute fracture. 



JOINTS:

Unremarkable. 



JOINT EFFUSION:

None. 



OTHER FINDINGS:

None.



IMPRESSION:

No demonstrated fracture or dislocation.

## 2018-06-20 NOTE — ED PDOC
Lower Extremity Pain/Injury


Time Seen by Provider: 06/20/18 16:53


Chief Complaint (Nursing): Lower Extremity Problem/Injury


Chief Complaint (Provider): left knee pain


History Per: Patient


History/Exam Limitations: no limitations


Onset/Duration Of Symptoms: Days (x2)


Current Symptoms Are (Timing): Still Present


Additional Complaint(s): 





Mynor Vargas is a 42 year old male, with no significant past medical history

, who presents to the emergency department for evaluation of left knee injury 

onset x2 days ago. Patient states he was playing basketball, while running he 

came to a stop when his right knee buckled but his left knee stayed straight. 

He reports a worsening pain since then, specially with ambulation and bearing 

weight. Patient has been taking Motrin 800mg for the pain, last dose was at 12:

30 today. No prior knee injury, no other physical complaints at present. 





PMD: Malcolm Gonzalez





Past Medical History


Reviewed: Historical Data, Nursing Documentation, Vital Signs


Vital Signs: 





 Last Vital Signs











Temp  98.2 F   06/20/18 16:49


 


Pulse  75   06/20/18 16:49


 


Resp  16   06/20/18 16:49


 


BP  125/83   06/20/18 16:49


 


Pulse Ox  96   06/20/18 16:49














- Medical History


PMH: Gastrointestinal Ulcer (G. I. bleed), Gall Bladder Disease





- Surgical History


Surgical History: Cholecystectomy, Endoscopy





- Family History


Family History: States: Unknown Family Hx





- Social History


Current smoker - smoking cessation education provided: No


Alcohol: None


Drugs: Denies





- Home Medications


Home Medications: 


 Ambulatory Orders











 Medication  Instructions  Recorded


 


traMADol [Ultram] 50 mg PO Q4 #20 tab 10/27/16


 


Hydrocodone/Acetaminophen 1 each PO Q6 PRN #15 tablet 10/29/16





[Hydrocodon-Acetaminophen 5-325]  


 


Ondansetron [Zofran Odt] 4 mg PO Q8 PRN #12 odt 10/29/16


 


Amoxicillin/Clavulanate [Augmentin 1 tab PO BID #14 tab 06/13/17





875 MG-125 MG]  


 


Amoxicillin/Clavulanate [Augmentin 1 tab PO BID #20 tab 08/03/17





875 MG-125 MG]  


 


Naproxen [Naprosyn] 500 mg PO BID PRN #20 tablet 08/03/17


 


Meloxicam [Mobic] 15 mg PO DAILY PRN #15 tab 09/16/17


 


Dicyclomine [Bentyl] 20 mg PO BID PRN #30 tab 10/26/17


 


Albuterol HFA [Ventolin HFA 90 1 puff IH Q4 PRN #1 inh 02/14/18





mcg/actuation (8 g)]  


 


Acetaminophen [Acetaminophen 8 650 mg PO Q8 PRN #24 tablet.er 06/20/18





Hour]  


 


Ibuprofen [Motrin Tab] 800 mg PO Q8 PRN #24 tab 06/20/18














- Allergies


Allergies/Adverse Reactions: 


 Allergies











Allergy/AdvReac Type Severity Reaction Status Date / Time


 


No Known Allergies Allergy   Verified 09/16/17 12:30














Review of Systems


ROS Statement: Except As Marked, All Systems Reviewed And Found Negative


Musculoskeletal: Positive for: Leg Pain (left knee)





Physical Exam





- Reviewed


Nursing Documentation Reviewed: Yes


Vital Signs Reviewed: Yes





- Physical Exam


Comments: 


GENERAL APPEARANCE: Patient is awake, alert, oriented x 3, in no acute 

distress. Resting comfortably and ambulatory in ED with limp.


SKIN: Warm, dry; (-) cyanosis.


NECK: Supple, FROM


ENT: Mucus membranes moist.


CHEST AND RESPIRATORY:  (-) rales, (-) rhonchi, (-) wheezes; breath sounds 

equal bilaterally. Respirations even and nonlabored.


HEART AND CARDIOVASCULAR:  (-) irregularity; (-) murmur, (-) gallop.


LOWER EXTREMITY: Knee: (+) Diffuse tenderness of the left knee, (+) Decreased 

flexion secondary to pain. No instability on Valgus or Vargus stress test, (-) 

Anterior and posterior drawer sign. (-)Effusion, ecchymosis, erythema, or warmth

; No calf tenderness. Achilles tendon intact and nontender. Foot, ankle or hip: 

(-) injury.


CARDIOVASCULAR: (+) distal pulse.


NEUROLOGIC: (+) distal sensation.





- ECG


O2 Sat by Pulse Oximetry: 96 (RA)


Pulse Ox Interpretation: Normal





Medical Decision Making


Medical Decision Making: 


Time: 16:53


Initial Impression: Acute knee pain


Initial Plan:


--Knee 3 views LT [RAD]


--Tylenol 325 mg tab 650 mg PO


--Ultram 50 mg PO (Patient not driving home)


--Reevaluation





17:23


Knee X-Ray


FINDINGS:


BONES:


No acute fracture. 


JOINTS:


Unremarkable. 


JOINT EFFUSION:


None. 


OTHER FINDINGS:


None.


IMPRESSION:


No demonstrated fracture or dislocation.





Crutches and knee immobilizer ordered. Placement and application verified by 

Zen JANG. NV intact after placement. Instructed on crutch walking. 





18:15


On re-evaluation, patient reports improvement of symptoms. On exam, patient 

remains AAOx3, in no acute distress. On exam, neck is supple, lungs CTA, 

cardiac RRR, neuro exam shows no focal findings. VSS, stable for discharge. 


Diagnostic results d/w the patient in great detail. Dx of acute knee pain/

sprain d/w the patient.  RICE encouraged. 


Based on history, exam and diagnostic results plan will for discharge and 

outpatient follow up.





Advised to follow up with primary care physician/ortho in 1-2 days without 

fail. Advised to take medication as prescribed. Return to the emergency room at 

any time for any new or worsening symptoms.


Patient states he fully agrees with and understands discharge instructions. 

States that he agrees with the plan and disposition. Verbalized and repeated 

discharge instructions and plan. I have given the patient opportunity to ask 

any additional questions.





--------------------------------------------------------------------------------

--------------------------------------------------------------------------------

------------------   


Scribe Attestation:


Documented by Al Worley, acting as a scribe for Deysi Zaldivar PA-C





Provider Scribe Attestation:


All medical record entries made by the Scribe were at my direction and 

personally dictated by me. I have reviewed the chart and agree that the record 

accurately reflects my personal performance of the history, physical exam, 

medical decision making, and the department course for this patient. I have 

also personally directed, reviewed, and agree with the discharge instructions 

and disposition.





Disposition





- Clinical Impression


Clinical Impression: 


 Knee pain, Knee sprain








- Patient ED Disposition


Is Patient to be Admitted: No


Counseled Patient/Family Regarding: Studies Performed, Diagnosis, Need For 

Followup, Rx Given





- Disposition


Referrals: 


Tish Carranza MD [Staff Provider] - 


Disposition: Routine/Home


Disposition Time: 18:19


Condition: IMPROVED


Additional Instructions: 


FOLLOW UP WITH PMD/ORTHO IN 1-2 DAYS FOR FURTHER EVALUATION.


RETURN TO ED WITH ANY NEW OR WORSENING SYMPTOMS.





REST


ICE


COMPRESS (BRACE)


ELEVATE EXTREMITY


Prescriptions: 


Acetaminophen [Acetaminophen 8 Hour] 650 mg PO Q8 PRN #24 tablet.er


 PRN Reason: Pain, Mild (1-3)


Ibuprofen [Motrin Tab] 800 mg PO Q8 PRN #24 tab


 PRN Reason: Pain, Moderate (4-7)


Instructions:  Knee Immobilizer (DC), Knee Sprain (DC), Knee Pain


Forms:  [facility] (English)


Print Language: ENGLISH





- POA


Present On Arrival: None

## 2018-09-07 ENCOUNTER — HOSPITAL ENCOUNTER (EMERGENCY)
Dept: HOSPITAL 31 - C.ER | Age: 43
Discharge: HOME | End: 2018-09-07
Payer: COMMERCIAL

## 2018-09-07 VITALS
DIASTOLIC BLOOD PRESSURE: 85 MMHG | SYSTOLIC BLOOD PRESSURE: 124 MMHG | TEMPERATURE: 98.6 F | HEART RATE: 73 BPM | RESPIRATION RATE: 20 BRPM

## 2018-09-07 VITALS — BODY MASS INDEX: 36.2 KG/M2

## 2018-09-07 VITALS — OXYGEN SATURATION: 96 %

## 2018-09-07 DIAGNOSIS — R10.11: Primary | ICD-10-CM

## 2018-09-07 LAB
ALBUMIN SERPL-MCNC: 4.7 G/DL (ref 3.5–5)
ALBUMIN/GLOB SERPL: 1.3 {RATIO} (ref 1–2.1)
ALT SERPL-CCNC: 41 U/L (ref 21–72)
AST SERPL-CCNC: 40 U/L (ref 17–59)
BACTERIA #/AREA URNS HPF: (no result) /[HPF]
BASOPHILS # BLD AUTO: 0.1 K/UL (ref 0–0.2)
BASOPHILS NFR BLD: 1 % (ref 0–2)
BILIRUB UR-MCNC: NEGATIVE MG/DL
BUN SERPL-MCNC: 15 MG/DL (ref 9–20)
CALCIUM SERPL-MCNC: 9.5 MG/DL (ref 8.6–10.4)
EOSINOPHIL # BLD AUTO: 0.1 K/UL (ref 0–0.7)
EOSINOPHIL NFR BLD: 1.7 % (ref 0–4)
ERYTHROCYTE [DISTWIDTH] IN BLOOD BY AUTOMATED COUNT: 13.4 % (ref 11.5–14.5)
GFR NON-AFRICAN AMERICAN: > 60
GLUCOSE UR STRIP-MCNC: NORMAL MG/DL
HGB BLD-MCNC: 14.3 G/DL (ref 12–18)
LEUKOCYTE ESTERASE UR-ACNC: (no result) LEU/UL
LIPASE: 93 U/L (ref 23–300)
LYMPHOCYTES # BLD AUTO: 1.9 K/UL (ref 1–4.3)
LYMPHOCYTES NFR BLD AUTO: 29.2 % (ref 20–40)
MCH RBC QN AUTO: 30.7 PG (ref 27–31)
MCHC RBC AUTO-ENTMCNC: 34.2 G/DL (ref 33–37)
MCV RBC AUTO: 89.7 FL (ref 80–94)
MONOCYTES # BLD: 0.6 K/UL (ref 0–0.8)
MONOCYTES NFR BLD: 8.6 % (ref 0–10)
NEUTROPHILS # BLD: 3.9 K/UL (ref 1.8–7)
NEUTROPHILS NFR BLD AUTO: 59.5 % (ref 50–75)
NRBC BLD AUTO-RTO: 0.1 % (ref 0–2)
PH UR STRIP: 5 [PH] (ref 5–8)
PLATELET # BLD: 293 K/UL (ref 130–400)
PMV BLD AUTO: 7.6 FL (ref 7.2–11.7)
PROT UR STRIP-MCNC: NEGATIVE MG/DL
RBC # BLD AUTO: 4.66 MIL/UL (ref 4.4–5.9)
RBC # UR STRIP: (no result) /UL
SP GR UR STRIP: 1.03 (ref 1–1.03)
SQUAMOUS EPITHIAL: < 1 /HPF (ref 0–5)
UROBILINOGEN UR-MCNC: NORMAL MG/DL (ref 0.2–1)
WBC # BLD AUTO: 6.5 K/UL (ref 4.8–10.8)

## 2018-09-07 NOTE — US
Date of service: 



09/07/2018



HISTORY:

ruq pain, s/p cholecystectomy



COMPARISON:

CT abdomen and pelvis with contrast performed 10/29/16



TECHNIQUE:

Sonographic evaluation of the right upper quadrant of the abdomen.



FINDINGS:

Examination limited by habitus. 



LIVER:

Measures 19.4 cm in length. Echogenic liver may be seen in setting of 

hepatic parenchymal disease or fatty infiltration.  No focal hepatic 

mass identified. No intrahepatic bile duct dilatation. 



GALLBLADDER:

Cholecystectomy. 



COMMON BILE DUCT:

Measures 6 mm. 



PANCREAS:

Not well-visualized.



RIGHT KIDNEY:

Measures 12.3 x 5.8 x 6.1 cm. No obstructing calculus or 

hydronephrosis identified. 



AORTA:

Limited visualization appears grossly unremarkable.



IVC:

Limited visualization appears grossly unremarkable.



OTHER FINDINGS:

None .



IMPRESSION:

Limited study. 



Hepatomegaly.



Echogenic liver may be seen in setting of hepatic parenchymal disease 

or fatty infiltration.



Cholecystectomy.

## 2018-09-07 NOTE — C.PDOC
History Of Present Illness


41 y/o male with history of Cholecystectomy  presents to ED with c/o sharp RUQ 

abdominal pain associated with nausea since yesterday. pain is minimal at this 

time, resolved spontaeously, but pt wanted to get it checked out. Patient 

reports last Bowel movement this morning and denies vomiting, dysuria, hematuria

, back pain, fever or chills. or any other complaints at this time.   


Time Seen by Provider: 09/07/18 11:00


Chief Complaint (Nursing): Abdominal Pain


History Per: Patient


History/Exam Limitations: no limitations


Onset/Duration Of Symptoms: Days


Current Symptoms Are (Timing): Still Present


Location Of Pain/Discomfort: RUQ





Past Medical History


Reviewed: Historical Data, Nursing Documentation, Vital Signs


Vital Signs: 


 Last Vital Signs











Temp  98.6 F   09/07/18 15:33


 


Pulse  73   09/07/18 15:33


 


Resp  20   09/07/18 15:33


 


BP  124/85   09/07/18 15:33


 


Pulse Ox  96   09/07/18 20:47














- Medical History


PMH: Gastrointestinal Ulcer (G. I. bleed), Gall Bladder Disease


Surgical History: Cholecystectomy, Endoscopy





- [facility] Procedures








RESECTION OF GALLBLADDER, PERCUTANEOUS ENDOSCOPIC APPROACH (10/25/16)








Family History: States: No Known Family Hx





- Social History


Hx Alcohol Use: Yes


Hx Substance Use: No (marijuana)





- Immunization History


Hx Tetanus Toxoid Vaccination: Yes


Hx Influenza Vaccination: No


Hx Pneumococcal Vaccination: No





Review Of Systems


Constitutional: Negative for: Fever, Chills


Gastrointestinal: Positive for: Nausea, Abdominal Pain.  Negative for: Vomiting

, Diarrhea


Genitourinary: Negative for: Dysuria, Hematuria


Skin: Negative for: Rash





Physical Exam





- Physical Exam


Appears: Non-toxic, No Acute Distress


Skin: Warm, Dry, No Rash


Head: Atraumatic, Normacephalic


Eye(s): bilateral: Normal Inspection


Oral Mucosa: Moist


Neck: Supple


Cardiovascular: Rhythm Regular


Respiratory: Normal Breath Sounds, No Rales, No Rhonchi, No Wheezing


Gastrointestinal/Abdominal: Bowel Sounds, Soft, Tenderness (Minimal RUQ ), No 

Guarding, No Rebound


Back: No CVA Tenderness


Neurological/Psych: Oriented x3, Normal Speech, Normal Cognition





ED Course And Treatment





- Laboratory Results


Result Diagrams: 


 09/07/18 11:48





 09/07/18 11:48


O2 Sat by Pulse Oximetry: 96 (RA)


Pulse Ox Interpretation: Normal





Medical Decision Making


Medical Decision Making: 





pt with neg ruq us, pain resolved, labs wnl except for slightly elevated tbili,

  abdomen on [id number] non tender, recommend d/c home with f/u Dr Ware and 

urology for hematuria.  Return to ER for any worse symptoms. 





Disposition


Counseled Patient/Family Regarding: Studies Performed, Diagnosis, Need For 

Followup, Rx Given





- Disposition


Referrals: 


Suraj Delgado MD [Staff Provider] - 


Luís Acuña MD [Staff Provider] - 


Disposition: HOME/ ROUTINE


Disposition Time: 15:14


Condition: GOOD


Additional Instructions: 


Please follow up with Dr Delgado for right upper quadrant pain.  Call for 

appointment,. ALso need to f/u with Dr Acuña or urologist of your choice, for 

hematuria- blood in the urine, as well as your primary care doctor, Return for 

any worse symptoms, .  


Forms:  General Discharge Instructions, [facility] (English), Work Excuse





- Clinical Impression


Clinical Impression: 


 Abdominal pain








- PA / NP / Resident Statement


MD/DO has reviewed & agrees with the documentation as recorded.





- Scribe Statement


The provider has reviewed the documentation as recorded by the Betiiblesly Mariee





All medical record entries made by the Gigi were at my direction and 

personally dictated by me. I have reviewed the chart and agree that the record 

accurately reflects my personal performance of the history, physical exam, 

medical decision making, and the department course for this patient. I have 

also personally directed, reviewed, and agree with the discharge instructions 

and disposition.